# Patient Record
Sex: FEMALE | URBAN - METROPOLITAN AREA
[De-identification: names, ages, dates, MRNs, and addresses within clinical notes are randomized per-mention and may not be internally consistent; named-entity substitution may affect disease eponyms.]

---

## 2020-02-04 ENCOUNTER — APPOINTMENT (OUTPATIENT)
Age: 70
Setting detail: DERMATOLOGY
End: 2020-02-05

## 2020-02-04 DIAGNOSIS — Z41.9 ENCOUNTER FOR PROCEDURE FOR PURPOSES OTHER THAN REMEDYING HEALTH STATE, UNSPECIFIED: ICD-10-CM

## 2020-02-04 PROCEDURE — OTHER FACIAL: OTHER

## 2020-02-04 ASSESSMENT — LOCATION ZONE DERM: LOCATION ZONE: FACE

## 2020-02-04 ASSESSMENT — LOCATION SIMPLE DESCRIPTION DERM: LOCATION SIMPLE: LEFT FOREHEAD

## 2020-02-04 ASSESSMENT — LOCATION DETAILED DESCRIPTION DERM: LOCATION DETAILED: LEFT MEDIAL FOREHEAD

## 2020-02-04 NOTE — HPI: COSMETIC (FACIAL)
Have You Had A Facial Before?: has not had a previous facial
When Outside In The Sun, Do You...: mostly burns, rarely tans
Additional History: Custom facial.\\nSteam. Double cleanse with BFW. Remove with 4x4 and hot towel. Finishing touch scrub. Remove with hot towel. Facial massage with oil 5 minutes. Cool towel. Extractions. Cool towel. PCA toner. Orange mask 10 minutes. Massage oil and chery dec and hands neck and shoulders. 10 minutes. Remove with hot towels. Warm 4x4. B3 and hydrating serum. Vitamin c lotion. Chery eye. Avene day.

## 2020-02-04 NOTE — PROCEDURE: FACIAL
Facial Steaming: steamed
Mask Type (Optional): papaya
Comments (Non-Sticky): Prepaid
Extraction Method: extractor
Exfoliation Type: exfoliant
Detail Level: Zone
Treatment Type (Optional): Spa Facial
Price (Use Numbers Only, No Special Characters Or $): 0